# Patient Record
Sex: FEMALE | Race: WHITE | URBAN - METROPOLITAN AREA
[De-identification: names, ages, dates, MRNs, and addresses within clinical notes are randomized per-mention and may not be internally consistent; named-entity substitution may affect disease eponyms.]

---

## 2017-01-19 ENCOUNTER — OFFICE VISIT (OUTPATIENT)
Dept: FAMILY MEDICINE CLINIC | Age: 52
End: 2017-01-19

## 2017-01-19 VITALS
TEMPERATURE: 97.9 F | DIASTOLIC BLOOD PRESSURE: 62 MMHG | OXYGEN SATURATION: 100 % | HEART RATE: 91 BPM | BODY MASS INDEX: 26.39 KG/M2 | SYSTOLIC BLOOD PRESSURE: 124 MMHG | WEIGHT: 164.2 LBS | HEIGHT: 66 IN

## 2017-01-19 DIAGNOSIS — J02.9 PHARYNGITIS, UNSPECIFIED ETIOLOGY: Primary | ICD-10-CM

## 2017-01-19 DIAGNOSIS — R10.31 RIGHT LOWER QUADRANT ABDOMINAL PAIN: ICD-10-CM

## 2017-01-19 PROCEDURE — 99213 OFFICE O/P EST LOW 20 MIN: CPT | Performed by: FAMILY MEDICINE

## 2017-01-19 PROCEDURE — 87880 STREP A ASSAY W/OPTIC: CPT | Performed by: FAMILY MEDICINE

## 2017-01-19 RX ORDER — DEXTROAMPHETAMINE SACCHARATE, AMPHETAMINE ASPARTATE MONOHYDRATE, DEXTROAMPHETAMINE SULFATE AND AMPHETAMINE SULFATE 2.5; 2.5; 2.5; 2.5 MG/1; MG/1; MG/1; MG/1
10 CAPSULE, EXTENDED RELEASE ORAL
COMMUNITY
End: 2017-10-16 | Stop reason: ALTCHOICE

## 2017-01-19 ASSESSMENT — ENCOUNTER SYMPTOMS
EYES NEGATIVE: 1
RESPIRATORY NEGATIVE: 1
GASTROINTESTINAL NEGATIVE: 1

## 2017-01-22 LAB — THROAT CULTURE: NORMAL

## 2017-01-23 ENCOUNTER — PATIENT MESSAGE (OUTPATIENT)
Dept: FAMILY MEDICINE CLINIC | Age: 52
End: 2017-01-23

## 2017-01-23 RX ORDER — AMOXICILLIN 500 MG/1
500 CAPSULE ORAL 3 TIMES DAILY
Qty: 30 CAPSULE | Refills: 0 | Status: SHIPPED | OUTPATIENT
Start: 2017-01-23 | End: 2017-02-02

## 2017-01-25 ENCOUNTER — TELEPHONE (OUTPATIENT)
Dept: FAMILY MEDICINE CLINIC | Age: 52
End: 2017-01-25

## 2017-01-27 ENCOUNTER — OFFICE VISIT (OUTPATIENT)
Dept: FAMILY MEDICINE CLINIC | Age: 52
End: 2017-01-27

## 2017-01-27 VITALS
DIASTOLIC BLOOD PRESSURE: 68 MMHG | HEIGHT: 66 IN | OXYGEN SATURATION: 98 % | BODY MASS INDEX: 24.94 KG/M2 | WEIGHT: 155.2 LBS | TEMPERATURE: 98.5 F | HEART RATE: 132 BPM | SYSTOLIC BLOOD PRESSURE: 108 MMHG

## 2017-01-27 DIAGNOSIS — Z12.11 SCREEN FOR COLON CANCER: ICD-10-CM

## 2017-01-27 DIAGNOSIS — R19.7 DIARRHEA OF PRESUMED INFECTIOUS ORIGIN: ICD-10-CM

## 2017-01-27 DIAGNOSIS — K52.9 GASTROENTERITIS: Primary | ICD-10-CM

## 2017-01-27 DIAGNOSIS — K52.9 GASTROENTERITIS: ICD-10-CM

## 2017-01-27 LAB
A/G RATIO: 1.3 (ref 1.1–2.2)
ALBUMIN SERPL-MCNC: 4 G/DL (ref 3.4–5)
ALP BLD-CCNC: 82 U/L (ref 40–129)
ALT SERPL-CCNC: 8 U/L (ref 10–40)
ANION GAP SERPL CALCULATED.3IONS-SCNC: 16 MMOL/L (ref 3–16)
AST SERPL-CCNC: 13 U/L (ref 15–37)
BASOPHILS ABSOLUTE: 0.1 K/UL (ref 0–0.2)
BASOPHILS RELATIVE PERCENT: 1 %
BILIRUB SERPL-MCNC: 0.3 MG/DL (ref 0–1)
BILIRUBIN, POC: NORMAL
BLOOD URINE, POC: NORMAL
BUN BLDV-MCNC: 21 MG/DL (ref 7–20)
CALCIUM SERPL-MCNC: 9.3 MG/DL (ref 8.3–10.6)
CHLORIDE BLD-SCNC: 98 MMOL/L (ref 99–110)
CLARITY, POC: NORMAL
CO2: 25 MMOL/L (ref 21–32)
COLOR, POC: YELLOW
CREAT SERPL-MCNC: 0.8 MG/DL (ref 0.6–1.1)
EOSINOPHILS ABSOLUTE: 0.1 K/UL (ref 0–0.6)
EOSINOPHILS RELATIVE PERCENT: 1 %
GFR AFRICAN AMERICAN: >60
GFR NON-AFRICAN AMERICAN: >60
GLOBULIN: 3.1 G/DL
GLUCOSE BLD-MCNC: 85 MG/DL (ref 70–99)
GLUCOSE URINE, POC: NORMAL
HCT VFR BLD CALC: 40.6 % (ref 36–48)
HEMOGLOBIN: 13.4 G/DL (ref 12–16)
KETONES, POC: NORMAL
LEUKOCYTE EST, POC: NORMAL
LYMPHOCYTES ABSOLUTE: 1.4 K/UL (ref 1–5.1)
LYMPHOCYTES RELATIVE PERCENT: 19.5 %
MCH RBC QN AUTO: 29 PG (ref 26–34)
MCHC RBC AUTO-ENTMCNC: 33 G/DL (ref 31–36)
MCV RBC AUTO: 87.8 FL (ref 80–100)
MONOCYTES ABSOLUTE: 0.6 K/UL (ref 0–1.3)
MONOCYTES RELATIVE PERCENT: 8.7 %
NEUTROPHILS ABSOLUTE: 4.9 K/UL (ref 1.7–7.7)
NEUTROPHILS RELATIVE PERCENT: 69.8 %
NITRITE, POC: NORMAL
PDW BLD-RTO: 12.5 % (ref 12.4–15.4)
PH, POC: 5
PLATELET # BLD: 379 K/UL (ref 135–450)
PMV BLD AUTO: 8.2 FL (ref 5–10.5)
POTASSIUM SERPL-SCNC: 4.9 MMOL/L (ref 3.5–5.1)
PROTEIN, POC: NORMAL
RBC # BLD: 4.63 M/UL (ref 4–5.2)
SODIUM BLD-SCNC: 139 MMOL/L (ref 136–145)
SPECIFIC GRAVITY, POC: 1.02
TOTAL PROTEIN: 7.1 G/DL (ref 6.4–8.2)
UROBILINOGEN, POC: 0.2
WBC # BLD: 7 K/UL (ref 4–11)

## 2017-01-27 PROCEDURE — 81002 URINALYSIS NONAUTO W/O SCOPE: CPT | Performed by: FAMILY MEDICINE

## 2017-01-27 PROCEDURE — 99213 OFFICE O/P EST LOW 20 MIN: CPT | Performed by: FAMILY MEDICINE

## 2017-01-27 RX ORDER — ZINC OXIDE 13 %
CREAM (GRAM) TOPICAL
COMMUNITY

## 2017-01-28 LAB
C DIFFICILE TOXIN, EIA: NORMAL
GI BACTERIAL PATHOGENS BY PCR: NORMAL

## 2017-01-29 ASSESSMENT — ENCOUNTER SYMPTOMS
GASTROINTESTINAL NEGATIVE: 1
EYES NEGATIVE: 1
RESPIRATORY NEGATIVE: 1

## 2017-08-23 ENCOUNTER — OFFICE VISIT (OUTPATIENT)
Dept: FAMILY MEDICINE CLINIC | Age: 52
End: 2017-08-23

## 2017-08-23 VITALS
DIASTOLIC BLOOD PRESSURE: 64 MMHG | WEIGHT: 161.6 LBS | OXYGEN SATURATION: 98 % | TEMPERATURE: 97.6 F | HEIGHT: 66 IN | HEART RATE: 64 BPM | BODY MASS INDEX: 25.97 KG/M2 | SYSTOLIC BLOOD PRESSURE: 112 MMHG

## 2017-08-23 DIAGNOSIS — N39.0 URINARY TRACT INFECTION WITH HEMATURIA, SITE UNSPECIFIED: Primary | ICD-10-CM

## 2017-08-23 DIAGNOSIS — R82.90 ABNORMAL FINDING ON URINALYSIS: ICD-10-CM

## 2017-08-23 DIAGNOSIS — R31.9 URINARY TRACT INFECTION WITH HEMATURIA, SITE UNSPECIFIED: Primary | ICD-10-CM

## 2017-08-23 DIAGNOSIS — Z00.00 ANNUAL PHYSICAL EXAM: ICD-10-CM

## 2017-08-23 LAB
BILIRUBIN, POC: ABNORMAL
BLOOD URINE, POC: ABNORMAL
CLARITY, POC: ABNORMAL
COLOR, POC: YELLOW
GLUCOSE URINE, POC: ABNORMAL
KETONES, POC: ABNORMAL
LEUKOCYTE EST, POC: ABNORMAL
NITRITE, POC: ABNORMAL
PH, POC: 7.5
PROTEIN, POC: ABNORMAL
SPECIFIC GRAVITY, POC: 1.02
UROBILINOGEN, POC: 0.2

## 2017-08-23 PROCEDURE — 99213 OFFICE O/P EST LOW 20 MIN: CPT | Performed by: NURSE PRACTITIONER

## 2017-08-23 PROCEDURE — 81002 URINALYSIS NONAUTO W/O SCOPE: CPT | Performed by: NURSE PRACTITIONER

## 2017-08-23 RX ORDER — NITROFURANTOIN 25; 75 MG/1; MG/1
100 CAPSULE ORAL 2 TIMES DAILY
Qty: 14 CAPSULE | Refills: 0 | Status: SHIPPED | OUTPATIENT
Start: 2017-08-23 | End: 2017-08-30

## 2017-08-24 ASSESSMENT — ENCOUNTER SYMPTOMS
GASTROINTESTINAL NEGATIVE: 1
EYES NEGATIVE: 1
RESPIRATORY NEGATIVE: 1
ALLERGIC/IMMUNOLOGIC NEGATIVE: 1
VOMITING: 0
NAUSEA: 0

## 2017-08-26 LAB
ORGANISM: ABNORMAL
URINE CULTURE, ROUTINE: ABNORMAL

## 2017-09-05 ENCOUNTER — TELEPHONE (OUTPATIENT)
Dept: FAMILY MEDICINE CLINIC | Age: 52
End: 2017-09-05

## 2017-09-05 DIAGNOSIS — R10.9 FLANK PAIN: Primary | ICD-10-CM

## 2017-09-25 ENCOUNTER — HOSPITAL ENCOUNTER (OUTPATIENT)
Dept: ULTRASOUND IMAGING | Age: 52
Discharge: HOME | End: 2017-09-25
Attending: NURSE PRACTITIONER | Admitting: NURSE PRACTITIONER

## 2017-09-25 DIAGNOSIS — R10.9 ABDOMINAL PAIN: ICD-10-CM

## 2017-09-25 DIAGNOSIS — R10.9 FLANK PAIN: ICD-10-CM

## 2017-10-16 ENCOUNTER — OFFICE VISIT (OUTPATIENT)
Dept: FAMILY MEDICINE CLINIC | Age: 52
End: 2017-10-16

## 2017-10-16 VITALS
DIASTOLIC BLOOD PRESSURE: 76 MMHG | TEMPERATURE: 98.3 F | SYSTOLIC BLOOD PRESSURE: 114 MMHG | WEIGHT: 149.6 LBS | BODY MASS INDEX: 24.15 KG/M2

## 2017-10-16 DIAGNOSIS — N30.00 ACUTE CYSTITIS WITHOUT HEMATURIA: Primary | ICD-10-CM

## 2017-10-16 LAB
BILIRUBIN, POC: NORMAL
BLOOD URINE, POC: NORMAL
CLARITY, POC: CLEAR
COLOR, POC: YELLOW
GLUCOSE URINE, POC: NORMAL
KETONES, POC: NORMAL
LEUKOCYTE EST, POC: NORMAL
NITRITE, POC: NORMAL
PH, POC: 7.5
PROTEIN, POC: NORMAL
SPECIFIC GRAVITY, POC: 1
UROBILINOGEN, POC: 0.2

## 2017-10-16 PROCEDURE — 81002 URINALYSIS NONAUTO W/O SCOPE: CPT | Performed by: FAMILY MEDICINE

## 2017-10-16 PROCEDURE — 99213 OFFICE O/P EST LOW 20 MIN: CPT | Performed by: FAMILY MEDICINE

## 2017-10-16 RX ORDER — NITROFURANTOIN 25; 75 MG/1; MG/1
100 CAPSULE ORAL 2 TIMES DAILY
Qty: 14 CAPSULE | Refills: 0 | Status: SHIPPED | OUTPATIENT
Start: 2017-10-16 | End: 2017-10-23

## 2017-10-16 ASSESSMENT — ENCOUNTER SYMPTOMS
GASTROINTESTINAL NEGATIVE: 1
EYES NEGATIVE: 1
RESPIRATORY NEGATIVE: 1

## 2017-10-16 NOTE — PROGRESS NOTES
Never Smoker    Smokeless tobacco: Never Used    Alcohol use Yes    Drug use: No    Sexual activity: Yes     Other Topics Concern    Not on file     Social History Narrative    No narrative on file         Any recent diagnostic tests, hospital reports, office notes or consultation letters were reviewed prior to and during the visit. Review of Systems   Constitutional: Negative. HENT: Negative. Eyes: Negative. Respiratory: Negative. Cardiovascular: Negative. Gastrointestinal: Negative. Genitourinary: Negative. Musculoskeletal: Negative. Psychiatric/Behavioral: Negative. Physical Exam   Constitutional: She appears well-developed and well-nourished. No distress. Abdominal: Soft. Normal appearance and bowel sounds are normal. She exhibits no distension. There is no hepatosplenomegaly. There is no tenderness. There is no CVA tenderness. No hernia. Musculoskeletal:        Lumbar back: She exhibits normal range of motion, no tenderness, no bony tenderness and no pain. Skin: She is not diaphoretic. 1. Acute cystitis without hematuria   The condition is deteriorating, will change treatment, investigate cause and make further recommendations when data back.  Will treat  nitrofurantoin, macrocrystal-monohydrate, (MACROBID) 100 MG capsule    Urine Culture    POCT Urinalysis no Micro             Brain Lomas MD

## 2017-10-19 LAB
ORGANISM: ABNORMAL
URINE CULTURE, ROUTINE: ABNORMAL
URINE CULTURE, ROUTINE: ABNORMAL

## 2017-10-26 ENCOUNTER — OFFICE VISIT (OUTPATIENT)
Dept: FAMILY MEDICINE CLINIC | Age: 52
End: 2017-10-26

## 2017-10-26 ENCOUNTER — HOSPITAL ENCOUNTER (OUTPATIENT)
Dept: OTHER | Age: 52
Discharge: HOME | End: 2017-10-26
Attending: FAMILY MEDICINE | Admitting: FAMILY MEDICINE

## 2017-10-26 VITALS
DIASTOLIC BLOOD PRESSURE: 84 MMHG | SYSTOLIC BLOOD PRESSURE: 112 MMHG | BODY MASS INDEX: 24.31 KG/M2 | TEMPERATURE: 98.1 F | HEART RATE: 60 BPM | OXYGEN SATURATION: 100 % | WEIGHT: 150.6 LBS

## 2017-10-26 DIAGNOSIS — R07.89 OTHER CHEST PAIN: Primary | ICD-10-CM

## 2017-10-26 DIAGNOSIS — R07.89 OTHER CHEST PAIN: ICD-10-CM

## 2017-10-26 PROCEDURE — 99213 OFFICE O/P EST LOW 20 MIN: CPT | Performed by: FAMILY MEDICINE

## 2017-10-26 PROCEDURE — 93000 ELECTROCARDIOGRAM COMPLETE: CPT | Performed by: FAMILY MEDICINE

## 2017-10-29 ASSESSMENT — ENCOUNTER SYMPTOMS
GASTROINTESTINAL NEGATIVE: 1
RESPIRATORY NEGATIVE: 1
EYES NEGATIVE: 1

## 2017-10-29 NOTE — PROGRESS NOTES
10/29/17    Giovani Villavicencio  1965      Chief Complaint   Patient presents with    Chest Pain     sudden pain earlier today in chest, radiated to back, breathing made it worse, tingling in lower jaw, fine now     Chest Pain: Patient complains of chest pain. Onset was 6 hours ago, with resolved course since that time. The patient describes the pain as intermittent, dull in nature, does not radiate. Patient rates pain as a 4/10 in intensity. Associated symptoms are chest pain. Aggravating factors are none. Alleviating factors are: none. Patient's cardiac risk factors are dyslipidemia and family history of premature cardiovascular disease. Patient's risk factors for DVT/PE: none. Previous cardiac testing: none.       Vitals:    10/26/17 1613   BP: 112/84   Pulse: 60   Temp: 98.1 °F (36.7 °C)   SpO2: 100%         Immunization History   Administered Date(s) Administered    DTaP 10/02/2008    MMR 10/02/2008    Tdap (Boostrix, Adacel) 01/10/2012       No Known Allergies  Outpatient Prescriptions Marked as Taking for the 10/26/17 encounter (Office Visit) with Matthieu Osborn MD   Medication Sig Dispense Refill    Probiotic Product (PROBIOTIC DAILY) CAPS Take by mouth         Past Medical History:   Diagnosis Date    ADHD (attention deficit hyperactivity disorder)     Anxiety     Cancer ([address] 2009    basal cell    Chicken pox     Depression      Past Surgical History:   Procedure Laterality Date    BREAST SURGERY      lumpectomy: benign    URETHRAL STRICTURE DILATATION  as child     Family History   Problem Relation Age of Onset    Cancer Mother      breast    Heart Disease Father     Cancer Brother     Stroke Maternal Grandmother     Heart Disease Paternal Grandmother     Diabetes Other     High Blood Pressure Sister     Heart Disease Maternal Grandfather     Depression Brother      Social History     Social History    Marital status:      Spouse name: N/A    Number of children: N/A  Years of education: N/A     Occupational History    Not on file. Social History Main Topics    Smoking status: Never Smoker    Smokeless tobacco: Never Used    Alcohol use Yes    Drug use: No    Sexual activity: Yes     Other Topics Concern    Not on file     Social History Narrative    No narrative on file         Any recent diagnostic tests, hospital reports, office notes or consultation letters were reviewed prior to and during the visit. Review of Systems   Constitutional: Negative. HENT: Negative. Eyes: Negative. Respiratory: Negative. Cardiovascular: Negative. Gastrointestinal: Negative. Genitourinary: Negative. Musculoskeletal: Negative. Psychiatric/Behavioral: Negative. Physical Exam   Constitutional: She appears well-developed and well-nourished. No distress. Neck: Normal range of motion. Neck supple. Normal carotid pulses, no hepatojugular reflux and no JVD present. Carotid bruit is not present. No tracheal deviation present. No thyromegaly present. Cardiovascular: Normal rate, regular rhythm, S2 normal, normal heart sounds and intact distal pulses. PMI is not displaced. Exam reveals no gallop and no friction rub. No murmur heard. Pulses:       Carotid pulses are 2+ on the right side, and 2+ on the left side. Dorsalis pedis pulses are 2+ on the right side, and 2+ on the left side. Posterior tibial pulses are 2+ on the right side, and 2+ on the left side. Pulmonary/Chest: Effort normal and breath sounds normal. No stridor. No respiratory distress. She has no wheezes. She has no rales. She exhibits no tenderness and no bony tenderness. Abdominal: Soft. Bowel sounds are normal. She exhibits no distension and no mass. There is no tenderness. There is no rebound and no guarding. Musculoskeletal:        Right ankle: She exhibits no swelling. Left ankle: She exhibits no swelling.    Lymphadenopathy:     She has no cervical adenopathy. Skin: She is not diaphoretic. Psychiatric: She has a normal mood and affect. Her behavior is normal. Judgment and thought content normal.         1. Other chest pain The condition is deteriorating, will change treatment, investigate cause and make further recommendations when data back. Suspect gerd teaching done.   EKG 12 Lead    XR CHEST STANDARD (2 VW)             Jose Dow MD

## 2017-11-20 ENCOUNTER — OFFICE VISIT (OUTPATIENT)
Dept: FAMILY MEDICINE CLINIC | Age: 52
End: 2017-11-20

## 2017-11-20 VITALS
DIASTOLIC BLOOD PRESSURE: 66 MMHG | BODY MASS INDEX: 24.63 KG/M2 | SYSTOLIC BLOOD PRESSURE: 110 MMHG | TEMPERATURE: 98.5 F | WEIGHT: 152.6 LBS

## 2017-11-20 DIAGNOSIS — J02.8 ACUTE PHARYNGITIS DUE TO OTHER SPECIFIED ORGANISMS: Primary | ICD-10-CM

## 2017-11-20 DIAGNOSIS — N39.0 POSTCOITAL UTI: ICD-10-CM

## 2017-11-20 PROCEDURE — 87880 STREP A ASSAY W/OPTIC: CPT | Performed by: FAMILY MEDICINE

## 2017-11-20 PROCEDURE — 99213 OFFICE O/P EST LOW 20 MIN: CPT | Performed by: FAMILY MEDICINE

## 2017-11-20 RX ORDER — SULFAMETHOXAZOLE AND TRIMETHOPRIM 800; 160 MG/1; MG/1
1 TABLET ORAL 2 TIMES DAILY
Qty: 40 TABLET | Refills: 0 | Status: SHIPPED | OUTPATIENT
Start: 2017-11-20 | End: 2018-05-03 | Stop reason: SDUPTHER

## 2017-11-20 RX ORDER — AMOXICILLIN 500 MG/1
500 CAPSULE ORAL 3 TIMES DAILY
Qty: 30 CAPSULE | Refills: 0 | Status: SHIPPED | OUTPATIENT
Start: 2017-11-20 | End: 2017-11-30

## 2017-11-20 ASSESSMENT — ENCOUNTER SYMPTOMS
RESPIRATORY NEGATIVE: 1
EYES NEGATIVE: 1
GASTROINTESTINAL NEGATIVE: 1

## 2017-11-20 NOTE — PROGRESS NOTES
Occupational History    Not on file. Social History Main Topics    Smoking status: Never Smoker    Smokeless tobacco: Never Used    Alcohol use Yes    Drug use: No    Sexual activity: Yes     Other Topics Concern    Not on file     Social History Narrative    No narrative on file         Any recent diagnostic tests, hospital reports, office notes or consultation letters were reviewed prior to and during the visit. Review of Systems   Constitutional: Negative. HENT: Negative. Eyes: Negative. Respiratory: Negative. Cardiovascular: Negative. Gastrointestinal: Negative. Genitourinary: Negative. Musculoskeletal: Negative. Psychiatric/Behavioral: Negative. Physical Exam   Constitutional: She appears well-developed and well-nourished. She appears distressed. HENT:   Head: Normocephalic and atraumatic. Right Ear: Hearing, tympanic membrane, external ear and ear canal normal. No drainage, swelling or tenderness. Tympanic membrane is not perforated, not erythematous, not retracted and not bulging. Tympanic membrane mobility is normal. No middle ear effusion. Left Ear: External ear normal. No drainage, swelling or tenderness. Tympanic membrane is not perforated, not erythematous, not retracted and not bulging. Tympanic membrane mobility is normal.  No middle ear effusion. Nose: Nose normal. No mucosal edema, rhinorrhea, sinus tenderness or septal deviation. No epistaxis. Right sinus exhibits no maxillary sinus tenderness and no frontal sinus tenderness. Left sinus exhibits no maxillary sinus tenderness and no frontal sinus tenderness. Mouth/Throat: Posterior oropharyngeal erythema present. No oropharyngeal exudate. Eyes: Conjunctivae, EOM and lids are normal. Pupils are equal, round, and reactive to light. Right eye exhibits no discharge and no exudate. Left eye exhibits no discharge and no exudate. No scleral icterus.    Neck: Trachea normal and normal range of

## 2017-11-23 LAB — THROAT CULTURE: NORMAL

## 2018-03-01 ENCOUNTER — OFFICE VISIT (OUTPATIENT)
Dept: FAMILY MEDICINE CLINIC | Age: 53
End: 2018-03-01

## 2018-03-01 VITALS
SYSTOLIC BLOOD PRESSURE: 114 MMHG | WEIGHT: 144 LBS | TEMPERATURE: 98.6 F | BODY MASS INDEX: 23.24 KG/M2 | HEART RATE: 80 BPM | OXYGEN SATURATION: 100 % | DIASTOLIC BLOOD PRESSURE: 74 MMHG

## 2018-03-01 DIAGNOSIS — I49.9 CARDIAC ARRHYTHMIA, UNSPECIFIED CARDIAC ARRHYTHMIA TYPE: Primary | ICD-10-CM

## 2018-03-01 DIAGNOSIS — R00.1 BRADYCARDIA: ICD-10-CM

## 2018-03-01 PROCEDURE — 99213 OFFICE O/P EST LOW 20 MIN: CPT | Performed by: FAMILY MEDICINE

## 2018-03-01 ASSESSMENT — ENCOUNTER SYMPTOMS
GASTROINTESTINAL NEGATIVE: 1
EYES NEGATIVE: 1
RESPIRATORY NEGATIVE: 1

## 2018-03-01 NOTE — PROGRESS NOTES
3/1/18    Stevie Romo  1965      Chief Complaint   Patient presents with    Tachycardia     heart rate increases during exercise, no chest pain     Arrhythmia: Patient presents for evaluation of fast heart rate. Onset was 3 weeks ago, with unchanged course since that time. Patient also complains of rapid heart beat. The patient denies abdominal pain, calf pain, chest pain, cough, dizziness and leg swelling. The patient has a past history of hyperlipidemia. The patient denies a past history of atrial fibrillation      /74   Pulse 80   Temp 98.6 °F (37 °C) (Oral)   Wt 144 lb (65.3 kg)   LMP 11/10/2011   SpO2 100%   BMI 23.24 kg/m²       Immunization History   Administered Date(s) Administered    DTaP 10/02/2008    MMR 10/02/2008    Tdap (Boostrix, Adacel) 01/10/2012       No Known Allergies  Outpatient Prescriptions Marked as Taking for the 3/1/18 encounter (Office Visit) with Danilo Hernandez MD   Medication Sig Dispense Refill    Probiotic Product (PROBIOTIC DAILY) CAPS Take by mouth         Past Medical History:   Diagnosis Date    ADHD (attention deficit hyperactivity disorder)     Anxiety     Cancer ([facility] [address] 2009    basal cell    Chicken pox     Depression      Past Surgical History:   Procedure Laterality Date    BREAST SURGERY      lumpectomy: benign    URETHRAL STRICTURE DILATATION  as child     Family History   Problem Relation Age of Onset    Cancer Mother      breast    Heart Disease Father     Cancer Brother     Stroke Maternal Grandmother     Heart Disease Paternal Grandmother     Diabetes Other     High Blood Pressure Sister     Heart Disease Maternal Grandfather     Depression Brother      Social History     Social History    Marital status:      Spouse name: N/A    Number of children: N/A    Years of education: N/A     Occupational History    Not on file.      Social History Main Topics    Smoking status: Never Smoker    Smokeless tobacco: Never

## 2018-03-07 ENCOUNTER — HOSPITAL ENCOUNTER (OUTPATIENT)
Dept: NON INVASIVE DIAGNOSTICS | Age: 53
Discharge: HOME | End: 2018-03-07
Attending: FAMILY MEDICINE | Admitting: FAMILY MEDICINE

## 2018-03-07 DIAGNOSIS — I49.9 CARDIAC ARRHYTHMIA: ICD-10-CM

## 2018-03-07 LAB
LV EF: 55 %
LVEF MODALITY: NORMAL

## 2018-03-12 LAB
ACQUISITION DURATION: NORMAL S
AVERAGE HEART RATE: 69 BPM
EKG DIAGNOSIS: NORMAL
HOLTER MAX HEART RATE: 172 BPM
HOOKUP DATE: NORMAL
HOOKUP TIME: NORMAL
Lab: NORMAL
MAX HEART RATE TIME/DATE: NORMAL
MIN HEART RATE TIME/DATE: NORMAL
MIN HEART RATE: 43 BPM
NUMBER OF QRS COMPLEXES: NORMAL
NUMBER OF SUPRAVENTRICULAR BEATS IN RUNS: 0
NUMBER OF SUPRAVENTRICULAR COUPLETS: 7
NUMBER OF SUPRAVENTRICULAR ECTOPICS: 43
NUMBER OF SUPRAVENTRICULAR ISOLATED BEATS: 29
NUMBER OF SUPRAVENTRICULAR RUNS: 0
NUMBER OF VENTRICULAR BEATS IN RUNS: 0
NUMBER OF VENTRICULAR BIGEMINAL CYCLES: 0
NUMBER OF VENTRICULAR COUPLETS: 0
NUMBER OF VENTRICULAR ECTOPICS: 2
NUMBER OF VENTRICULAR ISOLATED BEATS: 2
NUMBER OF VENTRICULAR RUNS: 0

## 2018-03-13 ENCOUNTER — PATIENT MESSAGE (OUTPATIENT)
Dept: FAMILY MEDICINE CLINIC | Age: 53
End: 2018-03-13

## 2018-03-14 RX ORDER — ALBUTEROL SULFATE 90 UG/1
2 AEROSOL, METERED RESPIRATORY (INHALATION) EVERY 6 HOURS PRN
Qty: 1 INHALER | Refills: 1 | Status: SHIPPED | OUTPATIENT
Start: 2018-03-14 | End: 2019-03-14

## 2018-05-03 ENCOUNTER — OFFICE VISIT (OUTPATIENT)
Dept: FAMILY MEDICINE CLINIC | Age: 53
End: 2018-05-03

## 2018-05-03 VITALS
TEMPERATURE: 99 F | HEIGHT: 66 IN | DIASTOLIC BLOOD PRESSURE: 64 MMHG | WEIGHT: 144.4 LBS | HEART RATE: 68 BPM | BODY MASS INDEX: 23.21 KG/M2 | SYSTOLIC BLOOD PRESSURE: 122 MMHG | OXYGEN SATURATION: 100 %

## 2018-05-03 DIAGNOSIS — N39.0 POSTCOITAL UTI: ICD-10-CM

## 2018-05-03 DIAGNOSIS — J45.990 EXERCISE-INDUCED REACTIVE AIRWAY DISEASE: ICD-10-CM

## 2018-05-03 DIAGNOSIS — N39.0 URINARY TRACT INFECTION WITH HEMATURIA, SITE UNSPECIFIED: Primary | ICD-10-CM

## 2018-05-03 DIAGNOSIS — R31.9 URINARY TRACT INFECTION WITH HEMATURIA, SITE UNSPECIFIED: Primary | ICD-10-CM

## 2018-05-03 PROCEDURE — 99213 OFFICE O/P EST LOW 20 MIN: CPT | Performed by: FAMILY MEDICINE

## 2018-05-03 PROCEDURE — 81002 URINALYSIS NONAUTO W/O SCOPE: CPT | Performed by: FAMILY MEDICINE

## 2018-05-03 RX ORDER — SULFAMETHOXAZOLE AND TRIMETHOPRIM 800; 160 MG/1; MG/1
1 TABLET ORAL 2 TIMES DAILY
Qty: 60 TABLET | Refills: 3 | Status: SHIPPED | OUTPATIENT
Start: 2018-05-03

## 2018-05-03 ASSESSMENT — ENCOUNTER SYMPTOMS
EYES NEGATIVE: 1
GASTROINTESTINAL NEGATIVE: 1
RESPIRATORY NEGATIVE: 1

## 2018-05-04 LAB
BILIRUBIN, POC: NORMAL
BLOOD URINE, POC: NORMAL
CLARITY, POC: CLEAR
COLOR, POC: YELLOW
GLUCOSE URINE, POC: NORMAL
KETONES, POC: NORMAL
LEUKOCYTE EST, POC: NORMAL
NITRITE, POC: NORMAL
PH, POC: 7
PROTEIN, POC: NORMAL
SPECIFIC GRAVITY, POC: 1.01
UROBILINOGEN, POC: 0.2

## 2018-05-05 LAB — URINE CULTURE, ROUTINE: NORMAL

## 2018-05-15 ENCOUNTER — E-VISIT (OUTPATIENT)
Dept: FAMILY MEDICINE CLINIC | Age: 53
End: 2018-05-15

## 2018-05-15 DIAGNOSIS — H10.9 BACTERIAL CONJUNCTIVITIS: Primary | ICD-10-CM

## 2018-05-15 PROCEDURE — 99444 PR PHYSICIAN ONLINE EVALUATION & MANAGEMENT SERVICE: CPT | Performed by: FAMILY MEDICINE

## 2018-05-15 RX ORDER — SULFACETAMIDE SODIUM 100 MG/ML
2 SOLUTION/ DROPS OPHTHALMIC 4 TIMES DAILY
Qty: 1 BOTTLE | Refills: 0 | Status: SHIPPED | OUTPATIENT
Start: 2018-05-15 | End: 2018-05-20

## 2018-12-21 ENCOUNTER — OFFICE VISIT (OUTPATIENT)
Dept: PRIMARY CARE CLINIC | Age: 53
End: 2018-12-21
Payer: COMMERCIAL

## 2018-12-21 VITALS
WEIGHT: 162.6 LBS | HEART RATE: 56 BPM | SYSTOLIC BLOOD PRESSURE: 102 MMHG | TEMPERATURE: 97.9 F | DIASTOLIC BLOOD PRESSURE: 78 MMHG | OXYGEN SATURATION: 99 % | BODY MASS INDEX: 26.24 KG/M2

## 2018-12-21 DIAGNOSIS — H66.002 ACUTE SUPPURATIVE OTITIS MEDIA OF LEFT EAR WITHOUT SPONTANEOUS RUPTURE OF TYMPANIC MEMBRANE, RECURRENCE NOT SPECIFIED: ICD-10-CM

## 2018-12-21 DIAGNOSIS — J01.90 ACUTE BACTERIAL SINUSITIS: Primary | ICD-10-CM

## 2018-12-21 DIAGNOSIS — B96.89 ACUTE BACTERIAL SINUSITIS: Primary | ICD-10-CM

## 2018-12-21 DIAGNOSIS — Z20.818 EXPOSURE TO STREP THROAT: ICD-10-CM

## 2018-12-21 LAB — S PYO AG THROAT QL: NORMAL

## 2018-12-21 PROCEDURE — 87880 STREP A ASSAY W/OPTIC: CPT | Performed by: NURSE PRACTITIONER

## 2018-12-21 PROCEDURE — 99213 OFFICE O/P EST LOW 20 MIN: CPT | Performed by: NURSE PRACTITIONER

## 2018-12-21 RX ORDER — NEOMYCIN SULFATE, POLYMYXIN B SULFATE AND DEXAMETHASONE 3.5; 10000; 1 MG/ML; [USP'U]/ML; MG/ML
SUSPENSION/ DROPS OPHTHALMIC
Refills: 0 | COMMUNITY
Start: 2018-11-08

## 2018-12-21 RX ORDER — AZITHROMYCIN 250 MG/1
TABLET, FILM COATED ORAL
Refills: 0 | COMMUNITY
Start: 2018-12-04

## 2018-12-21 RX ORDER — AMOXICILLIN AND CLAVULANATE POTASSIUM 875; 125 MG/1; MG/1
1 TABLET, FILM COATED ORAL 2 TIMES DAILY
Qty: 20 TABLET | Refills: 0 | Status: SHIPPED | OUTPATIENT
Start: 2018-12-21 | End: 2018-12-31

## 2018-12-21 RX ORDER — METHYLPREDNISOLONE 4 MG/1
TABLET ORAL
Qty: 1 KIT | Refills: 0 | Status: SHIPPED | OUTPATIENT
Start: 2018-12-21

## 2018-12-21 RX ORDER — NITROFURANTOIN MACROCRYSTALS 50 MG/1
CAPSULE ORAL
Refills: 2 | COMMUNITY
Start: 2018-11-21

## 2018-12-21 RX ORDER — LOTEPREDNOL ETABONATE 5 MG/G
GEL OPHTHALMIC
Refills: 0 | COMMUNITY
Start: 2018-12-03

## 2018-12-21 ASSESSMENT — ENCOUNTER SYMPTOMS
TROUBLE SWALLOWING: 0
BACK PAIN: 0
COUGH: 0
PHOTOPHOBIA: 0
STRIDOR: 0
EYE DISCHARGE: 1
WHEEZING: 0
EYE PAIN: 0
SORE THROAT: 1
CHEST TIGHTNESS: 0
SINUS PAIN: 1
EYE ITCHING: 0
SHORTNESS OF BREATH: 0
EYE REDNESS: 1
SINUS PRESSURE: 1
APNEA: 0

## 2018-12-23 LAB — THROAT CULTURE: NORMAL
